# Patient Record
Sex: MALE | Race: WHITE | Employment: FULL TIME | URBAN - METROPOLITAN AREA
[De-identification: names, ages, dates, MRNs, and addresses within clinical notes are randomized per-mention and may not be internally consistent; named-entity substitution may affect disease eponyms.]

---

## 2017-09-21 ENCOUNTER — OFFICE VISIT (OUTPATIENT)
Dept: URGENT CARE | Facility: CLINIC | Age: 47
End: 2017-09-21
Payer: COMMERCIAL

## 2017-09-21 VITALS
WEIGHT: 315 LBS | BODY MASS INDEX: 36.45 KG/M2 | OXYGEN SATURATION: 95 % | HEIGHT: 78 IN | SYSTOLIC BLOOD PRESSURE: 110 MMHG | DIASTOLIC BLOOD PRESSURE: 80 MMHG | TEMPERATURE: 97.9 F | HEART RATE: 78 BPM

## 2017-09-21 DIAGNOSIS — J01.00 ACUTE MAXILLARY SINUSITIS, RECURRENCE NOT SPECIFIED: ICD-10-CM

## 2017-09-21 PROCEDURE — 99214 OFFICE O/P EST MOD 30 MIN: CPT | Performed by: FAMILY MEDICINE

## 2017-09-21 RX ORDER — AMOXICILLIN AND CLAVULANATE POTASSIUM 875; 125 MG/1; MG/1
1 TABLET, FILM COATED ORAL 2 TIMES DAILY
Qty: 20 TAB | Refills: 0 | Status: SHIPPED | OUTPATIENT
Start: 2017-09-21 | End: 2017-10-01

## 2017-09-21 RX ORDER — FLUTICASONE PROPIONATE 50 MCG
1 SPRAY, SUSPENSION (ML) NASAL 2 TIMES DAILY
Qty: 1 BOTTLE | Refills: 0 | Status: SHIPPED | OUTPATIENT
Start: 2017-09-21 | End: 2018-12-13

## 2017-09-21 NOTE — LETTER
September 21, 2017         Patient: Bryan Araujo   YOB: 1970   Date of Visit: 9/21/2017           To Whom it May Concern:    Bryan Araujo was seen in my clinic on 9/21/2017.      If you have any questions or concerns, please don't hesitate to call.        Sincerely,           Esdras Chou M.D.  Electronically Signed

## 2017-09-22 NOTE — PROGRESS NOTES
"Chief Complaint   Patient presents with   • Headache     \"sinus pressure, HA         Sinusitis  This is a new problem. The current episode started in the past 7 days. The problem has been gradually worsening since onset. There has been no fever. Associated symptoms include congestion, coughing, headaches and sinus pressure. Pertinent negatives include no dyspnea, chest pain, n/v/diarrhea or sore throat. Past treatments include nothing.   He has had septoplasty in the past.     Social History   Substance Use Topics   • Smoking status: Never Smoker   • Smokeless tobacco: Never Used   • Alcohol use No         Current Outpatient Prescriptions on File Prior to Visit   Medication Sig Dispense Refill   • hydrocodone/acetaminophen (NORCO)  MG Tab Take 1 Tab by mouth every 6 hours as needed for Moderate Pain. 50 Tab 0   • cephALEXin (KEFLEX) 500 MG Cap Take 500 mg by mouth 4 times a day.       No current facility-administered medications on file prior to visit.            No Known Allergies      Family history was reviewed and not pertinent     Review of Systems   Constitutional: Negative for fever.   HENT: Positive for congestion and sinus pressure. Negative for sneezing and sore throat.    Respiratory: Positive for cough.    Neurological: Positive for headaches.   All other systems reviewed and are negative.         Objective:     Blood pressure 110/80, pulse 78, temperature 36.6 °C (97.9 °F), height 2.057 m (6' 9\"), weight (!) 147 kg (324 lb), SpO2 95 %.      Physical Exam   Constitutional: patient is oriented to person, place, and time. Patient appears well-developed and well-nourished.   HENT:   Head: Normocephalic and atraumatic.   Right Ear: Hearing, tympanic membrane and external ear normal.   Left Ear: Hearing, tympanic membrane and external ear normal.   Nose: Mucosal edema and rhinorrhea present.  . No epistaxis.  Left and right sinus tenderness noted  Mouth/Throat: Uvula is midline and mucous membranes are " normal. Oropharyngeal exudate present. No posterior oropharyngeal edema or posterior oropharyngeal erythema.   Eyes: Conjunctivae and EOM are normal. Pupils are equal, round, and reactive to light. Left and right eyes - no discharge. No scleral icterus.   Neck: Normal range of motion. Neck supple. No JVD present. No tracheal deviation present. No thyromegaly present.   Cardiovascular: Normal rate, regular rhythm, normal heart sounds and intact distal pulses.    No murmur heard.  Pulmonary/Chest: Breath sounds normal. No respiratory distress.   no wheezes, rales.      Musculoskeletal: Normal range of motion.   no edema.   Lymphadenopathy:     There is positive  cervical adenopathy.   Neurological:   alert and oriented to person, place, and time.   Skin: Skin is warm and dry. No erythema.   Psychiatric:   normal mood and affect.  behavior is normal.   Nursing note and vitals reviewed.          Assessment/Plan:     1. Acute maxillary sinusitis, recurrence not specified     - fluticasone (FLONASE) 50 MCG/ACT nasal spray; Spray 1 Spray in nose 2 times a day.  Dispense: 1 Bottle; Refill: 0  - amoxicillin-clavulanate (AUGMENTIN) 875-125 MG Tab; Take 1 Tab by mouth 2 times a day for 10 days.  Dispense: 20 Tab; Refill: 0     Follow up in one week if no improvement, sooner if symptoms worsen.

## 2018-08-29 ENCOUNTER — OFFICE VISIT (OUTPATIENT)
Dept: URGENT CARE | Facility: CLINIC | Age: 48
End: 2018-08-29
Payer: COMMERCIAL

## 2018-08-29 VITALS
RESPIRATION RATE: 18 BRPM | DIASTOLIC BLOOD PRESSURE: 90 MMHG | HEART RATE: 100 BPM | HEIGHT: 78 IN | OXYGEN SATURATION: 95 % | TEMPERATURE: 97.9 F | WEIGHT: 315 LBS | SYSTOLIC BLOOD PRESSURE: 128 MMHG | BODY MASS INDEX: 36.45 KG/M2

## 2018-08-29 DIAGNOSIS — J32.9 OTHER SINUSITIS, UNSPECIFIED CHRONICITY: ICD-10-CM

## 2018-08-29 DIAGNOSIS — H61.21 IMPACTED CERUMEN OF RIGHT EAR: ICD-10-CM

## 2018-08-29 PROCEDURE — 99214 OFFICE O/P EST MOD 30 MIN: CPT | Performed by: PHYSICIAN ASSISTANT

## 2018-08-29 RX ORDER — CEFUROXIME AXETIL 500 MG/1
500 TABLET ORAL 2 TIMES DAILY
Qty: 20 TAB | Refills: 0 | Status: SHIPPED | OUTPATIENT
Start: 2018-08-29 | End: 2018-09-08

## 2018-08-29 ASSESSMENT — ENCOUNTER SYMPTOMS
CONSTITUTIONAL NEGATIVE: 1
CARDIOVASCULAR NEGATIVE: 1
EYES NEGATIVE: 1
RESPIRATORY NEGATIVE: 1
SORE THROAT: 0
SINUS PAIN: 1
FEVER: 0

## 2018-08-29 NOTE — LETTER
August 29, 2018         Patient: Bryan Araujo   YOB: 1970   Date of Visit: 8/29/2018           To Whom it May Concern:    Bryan Araujo was seen in my clinic on 8/29/2018. He will return to work 8/30/2018, please excuse him for past couple days due to illness.    If you have any questions or concerns, please don't hesitate to call.        Sincerely,           Davide Brown P.A.-C.  Electronically Signed

## 2018-08-29 NOTE — PROGRESS NOTES
"Subjective:      Bryan Araujo is a 48 y.o. male who presents with Sinus Pain (x2days, right side pain, headache, numbness, congestion, earache)            Sinus Pain   This is a new problem. The current episode started in the past 7 days. The problem occurs constantly. The problem has been unchanged. Pertinent negatives include no congestion, fever or sore throat. Nothing aggravates the symptoms. He has tried nothing for the symptoms. The treatment provided no relief.       Review of Systems   Constitutional: Negative.  Negative for fever.   HENT: Positive for sinus pain. Negative for congestion and sore throat.    Eyes: Negative.    Respiratory: Negative.    Cardiovascular: Negative.    Skin: Negative.    All other systems reviewed and are negative.         Objective:     /90   Pulse 100   Temp 36.6 °C (97.9 °F)   Resp 18   Ht 2.057 m (6' 9\")   Wt (!) 145.4 kg (320 lb 9.6 oz)   SpO2 95%   BMI 34.36 kg/m²      Physical Exam   Constitutional: He is oriented to person, place, and time. He appears well-developed and well-nourished. No distress.   HENT:   Head: Normocephalic and atraumatic.   Mouth/Throat: No oropharyngeal exudate.   +maxill.sinus press.  +r ear wax, flushed       Eyes: Pupils are equal, round, and reactive to light. Conjunctivae and EOM are normal.   Neck: Normal range of motion. Neck supple.   Cardiovascular: Normal rate.    Pulmonary/Chest: Effort normal and breath sounds normal. No respiratory distress.   Lymphadenopathy:     He has no cervical adenopathy.   Neurological: He is alert and oriented to person, place, and time.   Skin: Skin is warm and dry. No rash noted.   Psychiatric: He has a normal mood and affect. His behavior is normal.   Nursing note and vitals reviewed.    Active Ambulatory Problems     Diagnosis Date Noted   • Nasal fracture 12/30/2015     Resolved Ambulatory Problems     Diagnosis Date Noted   • No Resolved Ambulatory Problems     No Additional Past " Medical History     Current Outpatient Prescriptions on File Prior to Visit   Medication Sig Dispense Refill   • fluticasone (FLONASE) 50 MCG/ACT nasal spray Spray 1 Spray in nose 2 times a day. 1 Bottle 0   • hydrocodone/acetaminophen (NORCO)  MG Tab Take 1 Tab by mouth every 6 hours as needed for Moderate Pain. 50 Tab 0   • cephALEXin (KEFLEX) 500 MG Cap Take 500 mg by mouth 4 times a day.       No current facility-administered medications on file prior to visit.      Social History     Social History   • Marital status: Single     Spouse name: N/A   • Number of children: N/A   • Years of education: N/A     Occupational History   • Not on file.     Social History Main Topics   • Smoking status: Never Smoker   • Smokeless tobacco: Never Used   • Alcohol use No   • Drug use: No   • Sexual activity: Not on file     Other Topics Concern   • Not on file     Social History Narrative   • No narrative on file     History reviewed. No pertinent family history.  Patient has no known allergies.              Assessment/Plan:     ·  sinusitis; wax imp.      · Start w/MucinexD; nsaids; [hold rx for abx prn [conditional use] if sx persist/worsen]  ·

## 2018-09-01 ENCOUNTER — OFFICE VISIT (OUTPATIENT)
Dept: URGENT CARE | Facility: CLINIC | Age: 48
End: 2018-09-01
Payer: COMMERCIAL

## 2018-09-01 VITALS
HEIGHT: 78 IN | OXYGEN SATURATION: 94 % | TEMPERATURE: 97.7 F | DIASTOLIC BLOOD PRESSURE: 88 MMHG | WEIGHT: 315 LBS | HEART RATE: 64 BPM | RESPIRATION RATE: 16 BRPM | BODY MASS INDEX: 36.45 KG/M2 | SYSTOLIC BLOOD PRESSURE: 114 MMHG

## 2018-09-01 DIAGNOSIS — H61.21 EXCESSIVE EAR WAX, RIGHT: ICD-10-CM

## 2018-09-01 PROCEDURE — 99213 OFFICE O/P EST LOW 20 MIN: CPT | Performed by: PHYSICIAN ASSISTANT

## 2018-09-01 ASSESSMENT — ENCOUNTER SYMPTOMS
NEUROLOGICAL NEGATIVE: 1
CONSTITUTIONAL NEGATIVE: 1
VERTIGO: 0

## 2018-09-01 NOTE — PROGRESS NOTES
"Subjective:      Bryan Araujo is a 48 y.o. male who presents with Ear Fullness (fv)            Ear Fullness   This is a new (r ear fullness) problem. The current episode started in the past 7 days. The problem occurs constantly. The problem has been unchanged. Pertinent negatives include no vertigo. Nothing aggravates the symptoms. He has tried nothing for the symptoms. The treatment provided no relief.       Review of Systems   Constitutional: Negative.    HENT: Positive for hearing loss. Negative for ear discharge, ear pain and tinnitus.    Skin: Negative.    Neurological: Negative.  Negative for vertigo.          Objective:     /88   Pulse 64   Temp 36.5 °C (97.7 °F)   Resp 16   Ht 2.057 m (6' 9\")   Wt (!) 145.2 kg (320 lb)   SpO2 94%   BMI 34.29 kg/m²      Physical Exam   Constitutional: He is oriented to person, place, and time. He appears well-developed and well-nourished. No distress.   HENT:   Head: Normocephalic and atraumatic.   r ear wax , flushed   Eyes: Pupils are equal, round, and reactive to light. EOM are normal.   Neck: Normal range of motion. Neck supple.   Lymphadenopathy:     He has no cervical adenopathy.   Neurological: He is alert and oriented to person, place, and time.   Skin: Skin is warm and dry.   Psychiatric: He has a normal mood and affect. His behavior is normal.   Nursing note and vitals reviewed.    Active Ambulatory Problems     Diagnosis Date Noted   • Nasal fracture 12/30/2015     Resolved Ambulatory Problems     Diagnosis Date Noted   • No Resolved Ambulatory Problems     No Additional Past Medical History     Med  Social History     Social History   • Marital status: Single     Spouse name: N/A   • Number of children: N/A   • Years of education: N/A     Occupational History   • Not on file.     Social History Main Topics   • Smoking status: Never Smoker   • Smokeless tobacco: Never Used   • Alcohol use No   • Drug use: No   • Sexual activity: Not on file "     Other Topics Concern   • Not on file     Social History Narrative   • No narrative on file     History reviewed. No pertinent family history.  Patient has no known allergies.  Current Outpatient Prescriptions on File Prior to Visit   Medication Sig Dispense Refill   • Pseudoephedrine-Guaifenesin (MUCINEX D PO) Take  by mouth.     • cefUROXime (CEFTIN) 500 MG Tab Take 1 Tab by mouth 2 times a day for 10 days. 20 Tab 0   • neomycin sulf/polymyx B sulf/HC soln (CORTISPORIN HC SOL) 3.5-22059-5 Solution Place 6-8 Drops in ear 4 times a day. In affected ear 10 mL 0   • fluticasone (FLONASE) 50 MCG/ACT nasal spray Spray 1 Spray in nose 2 times a day. 1 Bottle 0   • hydrocodone/acetaminophen (NORCO)  MG Tab Take 1 Tab by mouth every 6 hours as needed for Moderate Pain. 50 Tab 0   • cephALEXin (KEFLEX) 500 MG Cap Take 500 mg by mouth 4 times a day.       No current facility-administered medications on file prior to visit.                   Assessment/Plan:     ·  r ear wax      · rtc prn

## 2018-12-13 ENCOUNTER — OFFICE VISIT (OUTPATIENT)
Dept: URGENT CARE | Facility: CLINIC | Age: 48
End: 2018-12-13
Payer: COMMERCIAL

## 2018-12-13 ENCOUNTER — APPOINTMENT (OUTPATIENT)
Dept: RADIOLOGY | Facility: IMAGING CENTER | Age: 48
End: 2018-12-13
Attending: PHYSICIAN ASSISTANT
Payer: COMMERCIAL

## 2018-12-13 VITALS
SYSTOLIC BLOOD PRESSURE: 110 MMHG | WEIGHT: 315 LBS | HEART RATE: 87 BPM | BODY MASS INDEX: 36.45 KG/M2 | TEMPERATURE: 98 F | HEIGHT: 78 IN | OXYGEN SATURATION: 98 % | DIASTOLIC BLOOD PRESSURE: 70 MMHG

## 2018-12-13 DIAGNOSIS — S67.190A CRUSHING INJURY OF RIGHT INDEX FINGER, INITIAL ENCOUNTER: Primary | ICD-10-CM

## 2018-12-13 DIAGNOSIS — S67.190A CRUSHING INJURY OF RIGHT INDEX FINGER, INITIAL ENCOUNTER: ICD-10-CM

## 2018-12-13 PROCEDURE — 73140 X-RAY EXAM OF FINGER(S): CPT | Mod: 26,RT | Performed by: PHYSICIAN ASSISTANT

## 2018-12-13 PROCEDURE — 99214 OFFICE O/P EST MOD 30 MIN: CPT | Performed by: PHYSICIAN ASSISTANT

## 2018-12-13 RX ORDER — IBUPROFEN 800 MG/1
800 TABLET ORAL EVERY 8 HOURS PRN
Qty: 30 TAB | Refills: 3 | Status: SHIPPED | OUTPATIENT
Start: 2018-12-13 | End: 2018-12-23

## 2018-12-13 NOTE — PROGRESS NOTES
Subjective:      Pt is a 48 y.o. male who presents with Finger Injury (hit finger with butterfly knife )            HPI  This is a new problem. Pt notes right finger crush injury x 3 days after practicing moves with a butterfly knife he used to be great at 20 yrs ago for the first time since then and injured his right index finger. Pt has not taken any Rx medications for this condition. Pt states the pain is a 7/10 with movement, aching in nature and worse at night. Pt denies CP, SOB, NVD, paresthesias, headaches, dizziness, change in vision, hives, or other joint pain. The pt's medication list, problem list, and allergies have been evaluated and reviewed during today's visit.    PMH:  Negative per pt.      PSH:  Past Surgical History:   Procedure Laterality Date   • SEPTOPLASTY N/A 12/30/2015    Procedure: SEPTOPLASTY;  Surgeon: Андрей Manzo M.D.;  Location: SURGERY [facility];  Service:    • NASAL FRACTURE REDUCTION CLOSED N/A 12/30/2015    Procedure: NASAL FRACTURE REDUCTION CLOSED;  Surgeon: Андрей Manzo M.D.;  Location: SURGERY [facility];  Service:        Fam Hx:  the patient's family history is not pertinent to their current complaint      Soc HX:  Social History     Social History   • Marital status: Single     Spouse name: N/A   • Number of children: N/A   • Years of education: N/A     Occupational History   • Not on file.     Social History Main Topics   • Smoking status: Never Smoker   • Smokeless tobacco: Never Used   • Alcohol use No   • Drug use: No   • Sexual activity: Not on file     Other Topics Concern   • Not on file     Social History Narrative   • No narrative on file         Medications:    Current Outpatient Prescriptions:   •  ibuprofen (MOTRIN) 800 MG Tab, Take 1 Tab by mouth every 8 hours as needed for up to 10 days., Disp: 30 Tab, Rfl: 3  •  Pseudoephedrine-Guaifenesin (MUCINEX D PO), Take  by mouth., Disp: , Rfl:   •  cephALEXin (KEFLEX) 500 MG Cap, Take 500 mg by mouth 4  "times a day., Disp: , Rfl:       Allergies:  Patient has no known allergies.    ROS  Constitutional: Negative for fever, chills and malaise/fatigue.   HENT: Negative for congestion and sore throat.    Eyes: Negative for blurred vision, double vision and photophobia.   Respiratory: Negative for cough and shortness of breath.  Cardiovascular: Negative for chest pain and palpitations.   Gastrointestinal: Negative for heartburn, nausea, vomiting, abdominal pain, diarrhea and constipation.   Genitourinary: Negative for dysuria and flank pain.  MUSC: right index finger crush injury   Skin: Negative for itching and rash.   Neurological: Negative for dizziness, tingling and headaches.   Endo/Heme/Allergies: Does not bruise/bleed easily.   Psychiatric/Behavioral: Negative for depression. The patient is not nervous/anxious.           Objective:     /70   Pulse 87   Temp 36.7 °C (98 °F)   Ht 2.057 m (6' 9\")   Wt (!) 145.2 kg (320 lb)   SpO2 98%   BMI 34.29 kg/m²      Physical Exam   Musculoskeletal:        Right hand: He exhibits decreased range of motion, tenderness, bony tenderness and swelling. He exhibits normal two-point discrimination, normal capillary refill, no deformity and no laceration. Normal sensation noted. Normal strength noted.        Hands:        Constitutional: PT is oriented to person, place, and time. PT appears well-developed and well-nourished. No distress.   HENT:   Head: Normocephalic and atraumatic.   Mouth/Throat: Oropharynx is clear and moist. No oropharyngeal exudate.   Eyes: Conjunctivae normal and EOM are normal. Pupils are equal, round, and reactive to light.   Neck: Normal range of motion. Neck supple. No thyromegaly present.   Cardiovascular: Normal rate, regular rhythm, normal heart sounds and intact distal pulses.  Exam reveals no gallop and no friction rub.    No murmur heard.  Pulmonary/Chest: Effort normal and breath sounds normal. No respiratory distress. PT has no wheezes. " PT has no rales. Pt exhibits no tenderness.   Abdominal: Soft. Bowel sounds are normal. PT exhibits no distension and no mass. There is no tenderness. There is no rebound and no guarding.   Neurological: PT is alert and oriented to person, place, and time. PT has normal reflexes. No cranial nerve deficit.   Skin: Skin is warm and dry. No rash noted. PT is not diaphoretic. No erythema.       Psychiatric: PT has a normal mood and affect. PT behavior is normal. Judgment and thought content normal.     RADS:  Narrative       12/13/2018 11:46 AM    HISTORY/REASON FOR EXAM: Right finger injury.      TECHNIQUE/EXAM DESCRIPTION AND NUMBER OF VIEWS: 3 views of the RIGHT fingers.    COMPARISON: None    FINDINGS:  Bone mineralization is normal. There is no evidence of fracture or dislocation. There is no evidence of arthropathy. There is soft tissue swelling involving the index finger.   Impression       No evidence of fracture or dislocation.   Reading Provider Reading Date   Joel Lucas M.D. Dec 13, 2018   Signing Provider Signing Date Signing Time   Joel Lucas M.D. Dec 13, 2018 12:00 PM          Assessment/Plan:     1. Crushing injury of right index finger, initial encounter    - DX-FINGER(S) 2+ RIGHT; Future  - ibuprofen (MOTRIN) 800 MG Tab; Take 1 Tab by mouth every 8 hours as needed for up to 10 days.  Dispense: 30 Tab; Refill: 3    Finger splint placed today  RICE therapy discussed  Gentle ROM exercises discussed  WBAT right hand  Ice/heat therapy discussed  OTC ibuprofen for pain control  Rest, fluids encouraged.  AVS with medical info given.  Pt was in full understanding and agreement with the plan.  Follow-up as needed if symptoms worsen or fail to improve.

## 2019-02-27 ENCOUNTER — OFFICE VISIT (OUTPATIENT)
Dept: URGENT CARE | Facility: CLINIC | Age: 49
End: 2019-02-27
Payer: COMMERCIAL

## 2019-02-27 VITALS
OXYGEN SATURATION: 95 % | RESPIRATION RATE: 16 BRPM | HEIGHT: 78 IN | DIASTOLIC BLOOD PRESSURE: 82 MMHG | TEMPERATURE: 97.6 F | BODY MASS INDEX: 36.45 KG/M2 | SYSTOLIC BLOOD PRESSURE: 116 MMHG | HEART RATE: 88 BPM | WEIGHT: 315 LBS

## 2019-02-27 DIAGNOSIS — J06.9 UPPER RESPIRATORY TRACT INFECTION, UNSPECIFIED TYPE: ICD-10-CM

## 2019-02-27 DIAGNOSIS — H61.23 BILATERAL IMPACTED CERUMEN: ICD-10-CM

## 2019-02-27 PROCEDURE — 99204 OFFICE O/P NEW MOD 45 MIN: CPT | Mod: 25 | Performed by: PHYSICIAN ASSISTANT

## 2019-02-27 PROCEDURE — 69210 REMOVE IMPACTED EAR WAX UNI: CPT | Performed by: PHYSICIAN ASSISTANT

## 2019-02-27 RX ORDER — AMOXICILLIN AND CLAVULANATE POTASSIUM 875; 125 MG/1; MG/1
1 TABLET, FILM COATED ORAL 2 TIMES DAILY
Qty: 14 TAB | Refills: 0 | Status: SHIPPED | OUTPATIENT
Start: 2019-02-27 | End: 2019-03-06

## 2019-02-27 ASSESSMENT — ENCOUNTER SYMPTOMS
PALPITATIONS: 0
CHILLS: 0
SINUS PRESSURE: 1
SINUS PAIN: 1
SORE THROAT: 1
WHEEZING: 0
SPUTUM PRODUCTION: 0
SHORTNESS OF BREATH: 0
HEMOPTYSIS: 0
FEVER: 0
COUGH: 1

## 2019-02-27 NOTE — PATIENT INSTRUCTIONS
"Upper Respiratory Infection, Adult  Most upper respiratory infections (URIs) are a viral infection of the air passages leading to the lungs. A URI affects the nose, throat, and upper air passages. The most common type of URI is nasopharyngitis and is typically referred to as \"the common cold.\"  URIs run their course and usually go away on their own. Most of the time, a URI does not require medical attention, but sometimes a bacterial infection in the upper airways can follow a viral infection. This is called a secondary infection. Sinus and middle ear infections are common types of secondary upper respiratory infections.  Bacterial pneumonia can also complicate a URI. A URI can worsen asthma and chronic obstructive pulmonary disease (COPD). Sometimes, these complications can require emergency medical care and may be life threatening.  What are the causes?  Almost all URIs are caused by viruses. A virus is a type of germ and can spread from one person to another.  What increases the risk?  You may be at risk for a URI if:  · You smoke.  · You have chronic heart or lung disease.  · You have a weakened defense (immune) system.  · You are very young or very old.  · You have nasal allergies or asthma.  · You work in crowded or poorly ventilated areas.  · You work in health care facilities or schools.  What are the signs or symptoms?  Symptoms typically develop 2-3 days after you come in contact with a cold virus. Most viral URIs last 7-10 days. However, viral URIs from the influenza virus (flu virus) can last 14-18 days and are typically more severe. Symptoms may include:  · Runny or stuffy (congested) nose.  · Sneezing.  · Cough.  · Sore throat.  · Headache.  · Fatigue.  · Fever.  · Loss of appetite.  · Pain in your forehead, behind your eyes, and over your cheekbones (sinus pain).  · Muscle aches.  How is this diagnosed?  Your health care provider may diagnose a URI by:  · Physical exam.  · Tests to check that your " symptoms are not due to another condition such as:  ¨ Strep throat.  ¨ Sinusitis.  ¨ Pneumonia.  ¨ Asthma.  How is this treated?  A URI goes away on its own with time. It cannot be cured with medicines, but medicines may be prescribed or recommended to relieve symptoms. Medicines may help:  · Reduce your fever.  · Reduce your cough.  · Relieve nasal congestion.  Follow these instructions at home:  · Take medicines only as directed by your health care provider.  · Gargle warm saltwater or take cough drops to comfort your throat as directed by your health care provider.  · Use a warm mist humidifier or inhale steam from a shower to increase air moisture. This may make it easier to breathe.  · Drink enough fluid to keep your urine clear or pale yellow.  · Eat soups and other clear broths and maintain good nutrition.  · Rest as needed.  · Return to work when your temperature has returned to normal or as your health care provider advises. You may need to stay home longer to avoid infecting others. You can also use a face mask and careful hand washing to prevent spread of the virus.  · Increase the usage of your inhaler if you have asthma.  · Do not use any tobacco products, including cigarettes, chewing tobacco, or electronic cigarettes. If you need help quitting, ask your health care provider.  How is this prevented?  The best way to protect yourself from getting a cold is to practice good hygiene.  · Avoid oral or hand contact with people with cold symptoms.  · Wash your hands often if contact occurs.  There is no clear evidence that vitamin C, vitamin E, echinacea, or exercise reduces the chance of developing a cold. However, it is always recommended to get plenty of rest, exercise, and practice good nutrition.  Contact a health care provider if:  · You are getting worse rather than better.  · Your symptoms are not controlled by medicine.  · You have chills.  · You have worsening shortness of breath.  · You have brown  or red mucus.  · You have yellow or brown nasal discharge.  · You have pain in your face, especially when you bend forward.  · You have a fever.  · You have swollen neck glands.  · You have pain while swallowing.  · You have white areas in the back of your throat.  Get help right away if:  · You have severe or persistent:  ¨ Headache.  ¨ Ear pain.  ¨ Sinus pain.  ¨ Chest pain.  · You have chronic lung disease and any of the following:  ¨ Wheezing.  ¨ Prolonged cough.  ¨ Coughing up blood.  ¨ A change in your usual mucus.  · You have a stiff neck.  · You have changes in your:  ¨ Vision.  ¨ Hearing.  ¨ Thinking.  ¨ Mood.  This information is not intended to replace advice given to you by your health care provider. Make sure you discuss any questions you have with your health care provider.  Document Released: 06/13/2002 Document Revised: 08/20/2017 Document Reviewed: 03/25/2015  [employer] Interactive Patient Education © 2017 [employer]

## 2019-02-27 NOTE — LETTER
February 27, 2019         Patient: Bryan Araujo   YOB: 1970   Date of Visit: 2/27/2019           To Whom it May Concern:    Bryan Araujo was seen in my clinic on 2/27/2019. Please excuse him from work 2/25-2/28/2019    If you have any questions or concerns, please don't hesitate to call.        Sincerely,           Antoine Willett P.A.-C.  Electronically Signed

## 2019-02-28 NOTE — PROGRESS NOTES
Subjective:      Bryan Araujo is a 48 y.o. male who presents with Sinus Problem (x 3 days, nasal congestion, runny and stuffy nose, Rt. ear fullness.  Little cough)            Sinus Problem   This is a new problem. The current episode started in the past 7 days. The problem is unchanged. Associated symptoms include congestion, coughing, sinus pressure and a sore throat. Pertinent negatives include no chills, ear pain or shortness of breath. Past treatments include nothing.       Review of Systems   Constitutional: Positive for malaise/fatigue. Negative for chills and fever.   HENT: Positive for congestion, sinus pain, sinus pressure and sore throat. Negative for ear pain.    Respiratory: Positive for cough. Negative for hemoptysis, sputum production, shortness of breath and wheezing.    Cardiovascular: Negative for chest pain and palpitations.   All other systems reviewed and are negative.    PMH:  has no past medical history on file.  MEDS:   Current Outpatient Prescriptions:   •  amoxicillin-clavulanate (AUGMENTIN) 875-125 MG Tab, Take 1 Tab by mouth 2 times a day for 7 days., Disp: 14 Tab, Rfl: 0  •  Pseudoephedrine-Guaifenesin (MUCINEX D PO), Take  by mouth., Disp: , Rfl:   •  cephALEXin (KEFLEX) 500 MG Cap, Take 500 mg by mouth 4 times a day., Disp: , Rfl:   ALLERGIES: No Known Allergies  SURGHX:   Past Surgical History:   Procedure Laterality Date   • SEPTOPLASTY N/A 12/30/2015    Procedure: SEPTOPLASTY;  Surgeon: Андрей Manzo M.D.;  Location: SURGERY [facility];  Service:    • NASAL FRACTURE REDUCTION CLOSED N/A 12/30/2015    Procedure: NASAL FRACTURE REDUCTION CLOSED;  Surgeon: Андрей Manzo M.D.;  Location: SURGERY [facility];  Service:      SOCHX:  reports that he has never smoked. He has never used smokeless tobacco. He reports that he does not drink alcohol or use drugs.  FH: Family history was reviewed, no pertinent findings to report  Medications, Allergies, and current problem  "list reviewed today in Epic       Objective:     /82 (BP Location: Left arm, Patient Position: Sitting, BP Cuff Size: Large adult)   Pulse 88   Temp 36.4 °C (97.6 °F) (Temporal)   Resp 16   Ht 2.057 m (6' 9\")   Wt (!) 148.3 kg (327 lb)   SpO2 95%   BMI 35.04 kg/m²      Physical Exam   Constitutional: He is oriented to person, place, and time. He appears well-developed and well-nourished. He is active.  Non-toxic appearance. He does not have a sickly appearance. He does not appear ill. No distress. He is not intubated.   HENT:   Head: Normocephalic and atraumatic.   Right Ear: Hearing, tympanic membrane, external ear and ear canal normal.   Left Ear: Hearing, tympanic membrane, external ear and ear canal normal.   Nose: Nose normal.   Mouth/Throat: Uvula is midline, oropharynx is clear and moist and mucous membranes are normal.   Eyes: Conjunctivae, EOM and lids are normal.   Neck: Normal range of motion and full passive range of motion without pain. Neck supple.   Cardiovascular: Regular rhythm, S1 normal, S2 normal and normal heart sounds.  Exam reveals no gallop and no friction rub.    No murmur heard.  Pulmonary/Chest: Effort normal and breath sounds normal. No accessory muscle usage. No apnea, no tachypnea and no bradypnea. He is not intubated. No respiratory distress. He has no decreased breath sounds. He has no wheezes. He has no rhonchi. He has no rales. He exhibits no tenderness.   Musculoskeletal: Normal range of motion.   Neurological: He is alert and oriented to person, place, and time.   Skin: Skin is warm and dry.   Psychiatric: He has a normal mood and affect. His speech is normal and behavior is normal. Judgment and thought content normal.   Vitals reviewed.              Assessment/Plan:   Discussed likely viral uri.  Contingent antibiotic prescription given to patient to fill upon meeting criteria of guidelines discussed.     Procedure: Cerumen Removal  Risks and benefits of procedure " discussed  Cerumen removed with curette and lavage after softening agent instilled  Patient tolerated well  Post procedure exam with clear canal and normal TM    1. Upper respiratory tract infection, unspecified type  - amoxicillin-clavulanate (AUGMENTIN) 875-125 MG Tab; Take 1 Tab by mouth 2 times a day for 7 days.  Dispense: 14 Tab; Refill: 0    2. Bilateral impacted cerumen  - ear lavage    Differential diagnosis, natural history, supportive care discussed. Follow-up with primary care provider within 7-10 days, emergency room precautions discussed.  Patient and/or family appears understanding of information.  Handout and review of patients diagnosis and treatment was discussed extensively.

## 2019-04-25 ENCOUNTER — OFFICE VISIT (OUTPATIENT)
Dept: URGENT CARE | Facility: CLINIC | Age: 49
End: 2019-04-25
Payer: COMMERCIAL

## 2019-04-25 VITALS
TEMPERATURE: 99 F | RESPIRATION RATE: 18 BRPM | HEIGHT: 78 IN | DIASTOLIC BLOOD PRESSURE: 68 MMHG | HEART RATE: 101 BPM | OXYGEN SATURATION: 94 % | WEIGHT: 315 LBS | BODY MASS INDEX: 36.45 KG/M2 | SYSTOLIC BLOOD PRESSURE: 120 MMHG

## 2019-04-25 DIAGNOSIS — R05.9 COUGH: ICD-10-CM

## 2019-04-25 DIAGNOSIS — J06.9 URI, ACUTE: ICD-10-CM

## 2019-04-25 DIAGNOSIS — J02.9 SORE THROAT: ICD-10-CM

## 2019-04-25 DIAGNOSIS — J40 BRONCHITIS: ICD-10-CM

## 2019-04-25 PROCEDURE — 99214 OFFICE O/P EST MOD 30 MIN: CPT | Performed by: NURSE PRACTITIONER

## 2019-04-25 ASSESSMENT — ENCOUNTER SYMPTOMS
SORE THROAT: 1
SPUTUM PRODUCTION: 1
SINUS PAIN: 1
RHINORRHEA: 1
COUGH: 1

## 2019-04-25 NOTE — PROGRESS NOTES
Subjective:      Bryan Araujo is a 48 y.o. male who presents with Sore Throat (phlegm, coughing up mucus, the first 2 weeks was more of a dry throat, x 3 days)    History reviewed. No pertinent past medical history.  Social History     Social History   • Marital status: Single     Spouse name: N/A   • Number of children: N/A   • Years of education: N/A     Occupational History   • Not on file.     Social History Main Topics   • Smoking status: Never Smoker   • Smokeless tobacco: Never Used   • Alcohol use No   • Drug use: No   • Sexual activity: Not on file     Other Topics Concern   • Not on file     Social History Narrative   • No narrative on file     History reviewed. No pertinent family history.    Allergies :Patient has no known allergies.    Patient is a 48-year-old male who presents with complaint of cough and upper respiratory infection with mild sore throat over the last 3 weeks.  States his symptoms had resolved completely last Friday and he felt completely asymptomatic for about 3 days and then his symptoms returned with a dry cough that is triggered by deep inspiration, sore and irritated throat, and mild nasal congestion.  No other ill family members.  He denies known fever.        Cough   This is a new problem. The current episode started 1 to 4 weeks ago. The problem has been unchanged. The problem occurs every few minutes. The cough is productive of sputum. Associated symptoms include nasal congestion, postnasal drip, rhinorrhea and a sore throat. Nothing aggravates the symptoms. He has tried nothing for the symptoms. The treatment provided no relief.       Review of Systems   Constitutional: Positive for malaise/fatigue.   HENT: Positive for congestion, postnasal drip, rhinorrhea, sinus pain and sore throat.    Respiratory: Positive for cough and sputum production.    Skin: Negative.    All other systems reviewed and are negative.         Objective:     /68   Pulse (!) 101   Temp  "37.2 °C (99 °F) (Temporal)   Resp 18   Ht 2.057 m (6' 9\")   Wt (!) 147.1 kg (324 lb 6.4 oz)   SpO2 94%   BMI 34.76 kg/m²      Physical Exam   Constitutional: He is oriented to person, place, and time. He appears well-developed and well-nourished.   HENT:   Head: Normocephalic.   Right Ear: External ear normal.   Left Ear: External ear normal.   Nose: Nose normal.   Mouth/Throat: Oropharynx is clear and moist. No oropharyngeal exudate.   Clear postnasal drainage   Eyes: Pupils are equal, round, and reactive to light. Conjunctivae and EOM are normal.   Neck: Normal range of motion. Neck supple.   Cardiovascular: Normal rate and regular rhythm.    Pulmonary/Chest: Effort normal and breath sounds normal. No respiratory distress. He has no wheezes. He has no rales. He exhibits no tenderness.   Coughing is triggered with deep inspiration   Musculoskeletal: Normal range of motion.   Neurological: He is alert and oriented to person, place, and time.   Skin: Skin is warm and dry. He is not diaphoretic.   Psychiatric: He has a normal mood and affect. His behavior is normal.   Vitals reviewed.              Assessment/Plan:     1. Sore throat  2.  Bronchitis  3.  Cough  4.  Upper respiratory infection    Albuterol; 2 puffs every 6 hours as needed  Zithromax; start only for development of purulent sputum  Over-the-counter cough syrup of choice  Patient declined prescription for cough medicine  Tylenol/Motrin as needed  Push fluids  Follow-up for persistent or worsening of symptoms      "